# Patient Record
Sex: FEMALE | Race: WHITE | NOT HISPANIC OR LATINO | URBAN - NONMETROPOLITAN AREA
[De-identification: names, ages, dates, MRNs, and addresses within clinical notes are randomized per-mention and may not be internally consistent; named-entity substitution may affect disease eponyms.]

---

## 2021-08-25 ENCOUNTER — IMPORTED ENCOUNTER (OUTPATIENT)
Dept: URBAN - NONMETROPOLITAN AREA CLINIC 1 | Facility: CLINIC | Age: 51
End: 2021-08-25

## 2021-08-25 PROBLEM — H52.4: Noted: 2021-08-25

## 2021-08-25 PROBLEM — H52.12: Noted: 2021-08-25

## 2021-08-25 PROCEDURE — S0620 ROUTINE OPHTHALMOLOGICAL EXA: HCPCS

## 2021-08-25 NOTE — PATIENT DISCUSSION
Myopia OS w/Presbyopia -  discussed findings w/patient -  new spectacle Rx issued -  continue to monitor; 's Notes: No PCP MR [id number]/[id number] defer

## 2022-04-09 ASSESSMENT — VISUAL ACUITY
OD_CC: 20/20
OS_CC: 20/20-

## 2022-04-09 ASSESSMENT — TONOMETRY
OS_IOP_MMHG: 17
OD_IOP_MMHG: 17

## 2024-04-12 ENCOUNTER — ESTABLISHED PATIENT (OUTPATIENT)
Dept: URBAN - NONMETROPOLITAN AREA CLINIC 1 | Facility: CLINIC | Age: 54
End: 2024-04-12

## 2024-04-12 DIAGNOSIS — H43.811: ICD-10-CM

## 2024-04-12 DIAGNOSIS — H25.13: ICD-10-CM

## 2024-04-12 DIAGNOSIS — H35.3131: ICD-10-CM

## 2024-04-12 DIAGNOSIS — E11.9: ICD-10-CM

## 2024-04-12 DIAGNOSIS — H52.4: ICD-10-CM

## 2024-04-12 PROCEDURE — 92014 COMPRE OPH EXAM EST PT 1/>: CPT

## 2024-04-12 PROCEDURE — 92015 DETERMINE REFRACTIVE STATE: CPT

## 2024-04-12 ASSESSMENT — VISUAL ACUITY
OU_SC: 20/20
OD_SC: 20/20-2
OS_SC: 20/20

## 2024-04-12 ASSESSMENT — TONOMETRY
OD_IOP_MMHG: 11
OS_IOP_MMHG: 12

## 2025-05-27 ENCOUNTER — COMPREHENSIVE EXAM (OUTPATIENT)
Age: 55
End: 2025-05-27

## 2025-05-27 DIAGNOSIS — H35.3131: ICD-10-CM

## 2025-05-27 DIAGNOSIS — H52.4: ICD-10-CM

## 2025-05-27 DIAGNOSIS — H25.13: ICD-10-CM

## 2025-05-27 DIAGNOSIS — H43.811: ICD-10-CM

## 2025-05-27 DIAGNOSIS — E11.9: ICD-10-CM

## 2025-05-27 PROCEDURE — 92014 COMPRE OPH EXAM EST PT 1/>: CPT

## 2025-05-27 PROCEDURE — 92134 CPTRZ OPH DX IMG PST SGM RTA: CPT

## 2025-05-27 PROCEDURE — 92015 DETERMINE REFRACTIVE STATE: CPT
